# Patient Record
Sex: MALE | Race: BLACK OR AFRICAN AMERICAN | NOT HISPANIC OR LATINO | Employment: OTHER | URBAN - METROPOLITAN AREA
[De-identification: names, ages, dates, MRNs, and addresses within clinical notes are randomized per-mention and may not be internally consistent; named-entity substitution may affect disease eponyms.]

---

## 2019-11-07 ENCOUNTER — HOSPITAL ENCOUNTER (EMERGENCY)
Facility: HOSPITAL | Age: 34
Discharge: HOME | End: 2019-11-08
Attending: EMERGENCY MEDICINE

## 2019-11-07 DIAGNOSIS — J20.8 VIRAL BRONCHITIS: Primary | ICD-10-CM

## 2019-11-07 DIAGNOSIS — R05.9 COUGH: ICD-10-CM

## 2019-11-07 PROCEDURE — 87502 INFLUENZA DNA AMP PROBE: CPT

## 2019-11-07 PROCEDURE — 96372 THER/PROPH/DIAG INJ SC/IM: CPT

## 2019-11-07 PROCEDURE — 99284 EMERGENCY DEPT VISIT MOD MDM: CPT | Mod: 25

## 2019-11-07 RX ORDER — DEXAMETHASONE SODIUM PHOSPHATE 4 MG/ML
8 INJECTION, SOLUTION INTRA-ARTICULAR; INTRALESIONAL; INTRAMUSCULAR; INTRAVENOUS; SOFT TISSUE
Status: COMPLETED | OUTPATIENT
Start: 2019-11-08 | End: 2019-11-08

## 2019-11-07 RX ORDER — KETOROLAC TROMETHAMINE 30 MG/ML
30 INJECTION, SOLUTION INTRAMUSCULAR; INTRAVENOUS
Status: COMPLETED | OUTPATIENT
Start: 2019-11-08 | End: 2019-11-08

## 2019-11-08 VITALS
HEIGHT: 71 IN | DIASTOLIC BLOOD PRESSURE: 87 MMHG | TEMPERATURE: 99 F | BODY MASS INDEX: 20.3 KG/M2 | RESPIRATION RATE: 18 BRPM | WEIGHT: 145 LBS | SYSTOLIC BLOOD PRESSURE: 128 MMHG | HEART RATE: 81 BPM | OXYGEN SATURATION: 99 %

## 2019-11-08 LAB
INFLUENZA A, MOLECULAR: NEGATIVE
INFLUENZA B, MOLECULAR: NEGATIVE
SPECIMEN SOURCE: NORMAL

## 2019-11-08 PROCEDURE — 63600175 PHARM REV CODE 636 W HCPCS: Performed by: EMERGENCY MEDICINE

## 2019-11-08 RX ORDER — NAPROXEN 500 MG/1
500 TABLET ORAL 2 TIMES DAILY PRN
Qty: 14 TABLET | Refills: 0 | Status: SHIPPED | OUTPATIENT
Start: 2019-11-08

## 2019-11-08 RX ORDER — BENZONATATE 100 MG/1
100 CAPSULE ORAL 3 TIMES DAILY PRN
Qty: 14 CAPSULE | Refills: 0 | Status: SHIPPED | OUTPATIENT
Start: 2019-11-08

## 2019-11-08 RX ADMIN — KETOROLAC TROMETHAMINE 30 MG: 30 INJECTION, SOLUTION INTRAMUSCULAR at 12:11

## 2019-11-08 RX ADMIN — DEXAMETHASONE SODIUM PHOSPHATE 8 MG: 4 INJECTION, SOLUTION INTRA-ARTICULAR; INTRALESIONAL; INTRAMUSCULAR; INTRAVENOUS; SOFT TISSUE at 12:11

## 2019-11-08 NOTE — ED PROVIDER NOTES
CHIEF COMPLAINT: cough and congestion, bodyaches    HPI:  Ramiro Mariee 33 y.o. comes into the ED today for a cough and congestion that started on Sunday, 5 days ago. C/o has runny nose, clear drainage. No sore throat. Slight sputum production. No hemoptysis. Has felt hot at home. Eating and drinking appropriately. No vomiting or diarrhea. No neck pain or stiffness but has all over body aches.     ROS  Constitutional: See above.   Eyes: No discharge. No pain.  HENT: See above.   Cardiovascular: No chest pain, no palpitations.  Respiratory: As above.  Gastrointestinal: No abdominal pain, no vomiting. No diarrhea.  Genitourinary: No hematuria, dysuria, urgency.  Musculoskeletal: No back pain.  Skin: No rashes, no lesions.  Neurological: No headache, no focal weakness.    No past medical history on file.    No past surgical history on file.    Social History     Socioeconomic History    Marital status: Single     Spouse name: Not on file    Number of children: Not on file    Years of education: Not on file    Highest education level: Not on file   Occupational History    Not on file   Social Needs    Financial resource strain: Not on file    Food insecurity:     Worry: Not on file     Inability: Not on file    Transportation needs:     Medical: Not on file     Non-medical: Not on file   Tobacco Use    Smoking status: Not on file   Substance and Sexual Activity    Alcohol use: Not on file    Drug use: Not on file    Sexual activity: Not on file   Lifestyle    Physical activity:     Days per week: Not on file     Minutes per session: Not on file    Stress: Not on file   Relationships    Social connections:     Talks on phone: Not on file     Gets together: Not on file     Attends Anabaptism service: Not on file     Active member of club or organization: Not on file     Attends meetings of clubs or organizations: Not on file     Relationship status: Not on file   Other Topics Concern    Not on file   Social  "History Narrative    Not on file       No family history on file.          Physical Exam  /87 (BP Location: Right arm, Patient Position: Sitting)   Pulse 81   Temp 99.3 °F (37.4 °C) (Oral)   Resp 18   Ht 5' 11" (1.803 m)   Wt 65.8 kg (145 lb)   SpO2 99%   BMI 20.22 kg/m²   Nursing note reviewed  General Appearance: The patient is alert, has no immediate need for airway protection and no signs of toxicity. No acute distress.  HEENT: Eyes: Pupils equal and round no pallor or injection. Extra ocular movements intact.   Mouth: Mucous membranes are moist. Oropharynx clear.  Neck: Neck is supple non-tender. No lymphadenopathy.  Respiratory: There are no retractions, lungs are clear to auscultation.  Cardiovascular: Regular rate and rhythm. No murmurs, rubs or gallops.  Gastrointestinal: Abdomen is soft and non-tender, no masses, bowel sounds normal.  Neurological: Alert and oriented x 4. CN II-XII grossly intact. No focal weakness. Strength intact 5/5 bilaterally in upper and lower extremities.  Skin: Warm and dry, no rashes.  Musculoskeletal: Extremities are non-tender, non-swollen and have full range of motion.     DIFFERENTIAL DIAGNOSIS: After history and physical exam a differential diagnosis was considered, but was not limited to influenza, bronchitis, URI, cough, laryngitis, tracheitis, asthma, sinusitis, pneumonia, viral, COPD, medication side effect.      ED COURSE:         Labs Reviewed   INFLUENZA A & B BY MOLECULAR       X-Ray Chest PA And Lateral   Final Result      No acute intrathoracic process identified.         Electronically signed by: José Miguel Lau MD   Date:    11/08/2019   Time:    00:02                [facility]        Ramiro Mariee comes in today for URI like symptoms, test negative for flu. CXR negative for pneumonia. The pt is likely suffering from a viral etiology that will need to run its course. No need for ABX at this time.  Pt is appropriate for discharge home. Warning signs for return " discussed at length. After taking into careful account the historical factors and physical exam findings of the patient's presentation today, in conjunction with the empirical and objective data obtained on ED workup, no acute emergent medical condition has been identified. The patient appears to be low risk for an emergent medical condition and I feel it is safe and appropriate at this time for the patient to be discharged to follow-up as detailed in their discharge instructions for reevaluation and possible continued outpatient workup and management. Regardless, an unremarkable evaluation in the ED does not preclude the development or presence of a serious or life threatening condition. As such, patient was instructed to return immediately for any worsening or change in current symptoms. Precautions for return discussed at length. Discharge and follow-up instructions discussed with the patients  who expressed understanding and willingness to comply with my recommendations.    Voice recognition software utilized in this note.      The primary encounter diagnosis was Viral bronchitis. A diagnosis of Cough was also pertinent to this visit.       Medication List      START taking these medications    benzonatate 100 MG capsule  Commonly known as:  TESSALON  Take 1 capsule (100 mg total) by mouth 3 (three) times daily as needed for Cough.     naproxen 500 MG tablet  Commonly known as:  NAPROSYN  Take 1 tablet (500 mg total) by mouth 2 (two) times daily as needed.           Where to Get Your Medications      You can get these medications from any pharmacy    Bring a paper prescription for each of these medications  · benzonatate 100 MG capsule  · naproxen 500 MG tablet             Patient advised to follow-up with PCP within 3 days for BP re-check if Blood Pressure was > 120/80 without history of hypertension             Capri Woodard MD  11/08/19 0053

## 2020-02-25 ENCOUNTER — HOSPITAL ENCOUNTER (EMERGENCY)
Facility: HOSPITAL | Age: 35
Discharge: HOME | End: 2020-02-26
Attending: EMERGENCY MEDICINE

## 2020-02-25 DIAGNOSIS — S01.81XA LACERATION OF FOREHEAD, INITIAL ENCOUNTER: ICD-10-CM

## 2020-02-25 DIAGNOSIS — F10.920 ALCOHOLIC INTOXICATION WITHOUT COMPLICATION: Primary | ICD-10-CM

## 2020-02-25 LAB — POCT GLUCOSE: 86 MG/DL (ref 70–110)

## 2020-02-25 PROCEDURE — 99284 EMERGENCY DEPT VISIT MOD MDM: CPT | Mod: 25

## 2020-02-25 PROCEDURE — 82962 GLUCOSE BLOOD TEST: CPT | Mod: 59

## 2020-02-26 VITALS
TEMPERATURE: 98 F | HEART RATE: 87 BPM | WEIGHT: 145 LBS | OXYGEN SATURATION: 97 % | SYSTOLIC BLOOD PRESSURE: 82 MMHG | BODY MASS INDEX: 20.3 KG/M2 | HEIGHT: 71 IN | RESPIRATION RATE: 15 BRPM | DIASTOLIC BLOOD PRESSURE: 51 MMHG

## 2020-02-26 PROCEDURE — 12011 RPR F/E/E/N/L/M 2.5 CM/<: CPT

## 2020-02-26 PROCEDURE — 25000003 PHARM REV CODE 250: Performed by: EMERGENCY MEDICINE

## 2020-02-26 RX ORDER — LIDOCAINE HYDROCHLORIDE AND EPINEPHRINE 10; 10 MG/ML; UG/ML
20 INJECTION, SOLUTION INFILTRATION; PERINEURAL ONCE
Status: COMPLETED | OUTPATIENT
Start: 2020-02-26 | End: 2020-02-26

## 2020-02-26 RX ORDER — ACETAMINOPHEN 500 MG
1000 TABLET ORAL
Status: COMPLETED | OUTPATIENT
Start: 2020-02-26 | End: 2020-02-26

## 2020-02-26 RX ADMIN — LIDOCAINE HYDROCHLORIDE AND EPINEPHRINE 20 ML: 10; 10 INJECTION, SOLUTION INFILTRATION; PERINEURAL at 01:02

## 2020-02-26 RX ADMIN — ACETAMINOPHEN 1000 MG: 500 TABLET ORAL at 01:02

## 2020-02-26 NOTE — ED NOTES
Pt lying in bed resting with eyes closed, easily arousable, in no acute distress.  Pt aware of plan of care for repair of laceration.  Bed locked and in lowest position, side rails up x 2, call light within reach, VSS, will continue to monitor
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
## 2020-02-26 NOTE — ED NOTES
Pt ambulatory off of unit. Pt refused d/c vitals. Pt states does not want to wait for d/c paperwork. Pt expresses feeling upset that pt was wanded by security upon arrival.
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
